# Patient Record
Sex: FEMALE | Race: WHITE | NOT HISPANIC OR LATINO | Employment: OTHER | URBAN - METROPOLITAN AREA
[De-identification: names, ages, dates, MRNs, and addresses within clinical notes are randomized per-mention and may not be internally consistent; named-entity substitution may affect disease eponyms.]

---

## 2018-06-28 ENCOUNTER — PREPPED CHART (OUTPATIENT)
Dept: URBAN - METROPOLITAN AREA CLINIC 43 | Facility: CLINIC | Age: 63
End: 2018-06-28

## 2018-06-28 ENCOUNTER — CONSULT (OUTPATIENT)
Dept: URBAN - METROPOLITAN AREA CLINIC 43 | Facility: CLINIC | Age: 63
End: 2018-06-28

## 2018-06-28 DIAGNOSIS — H02.834: ICD-10-CM

## 2018-06-28 DIAGNOSIS — H02.832: ICD-10-CM

## 2018-06-28 DIAGNOSIS — H02.831: ICD-10-CM

## 2018-06-28 DIAGNOSIS — H02.835: ICD-10-CM

## 2018-06-28 PROCEDURE — 99203 OFFICE O/P NEW LOW 30 MIN: CPT

## 2018-06-28 PROCEDURE — 92285 EXTERNAL OCULAR PHOTOGRAPHY: CPT

## 2018-06-28 ASSESSMENT — VISUAL ACUITY
OS_CC: 20/30
OD_CC: 20/30

## 2018-11-09 NOTE — PATIENT DISCUSSION
Pinguecula Counseling:  I have explained to the patient at length the diagnosis of pinguecula and its pathophysiology. I recommended the patient adequately protect their eyes from excessive UV light and dry, damaso conditions. The use of artificial tears in dry conditions was encouraged. Return for follow-up as scheduled.

## 2018-11-09 NOTE — PATIENT DISCUSSION
DEJAH OU:  PRESCRIBE ARTIFICIAL TEARS BID - QID. DECREASE OUTDOOR EXPOSURE AND USE UV PROTECTION/ SUNGLASSES WITH OUTDOOR ACTIVITIES. RETURN FOR FOLLOW UP AS SCHEDULED.

## 2022-07-09 ENCOUNTER — TELEPHONE ENCOUNTER (OUTPATIENT)
Dept: URBAN - METROPOLITAN AREA CLINIC 121 | Facility: CLINIC | Age: 67
End: 2022-07-09

## 2022-07-09 RX ORDER — PREDNISONE 5 MG/1
30MG DAILY FOR A WEEK, THEN REDUCE BY 5MG EVERY WEEK TABLET ORAL TAKE AS DIRECTED
Refills: 0 | OUTPATIENT
Start: 2013-04-01 | End: 2013-05-07

## 2022-07-09 RX ORDER — CIPROFLOXACIN HCL 500 MG
TABLET ORAL TWICE A DAY
Refills: 0 | OUTPATIENT
Start: 2013-03-20 | End: 2013-04-01

## 2022-07-09 RX ORDER — POLYETHYLENE GLYCOL 3350, SODIUM SULFATE, SODIUM CHLORIDE, POTASSIUM CHLORIDE, ASCORBIC ACID, SODIUM ASCORBATE 7.5-2.691G
KIT ORAL TAKE AS DIRECTED
Refills: 0 | OUTPATIENT
Start: 2013-04-01 | End: 2013-05-07

## 2022-07-09 RX ORDER — PREDNISONE 20 MG/1
40MG ONCE DAILY TABLET ORAL TAKE AS DIRECTED
Refills: 0 | OUTPATIENT
Start: 2013-03-20 | End: 2013-05-07

## 2022-07-10 ENCOUNTER — TELEPHONE ENCOUNTER (OUTPATIENT)
Dept: URBAN - METROPOLITAN AREA CLINIC 121 | Facility: CLINIC | Age: 67
End: 2022-07-10

## 2022-07-10 RX ORDER — PREDNISONE 5 MG/1
TABLET ORAL
Refills: 0 | Status: ACTIVE | COMMUNITY
Start: 2013-05-07

## 2022-07-10 RX ORDER — ZOLMITRIPTAN 5 MG/1
SPRAY, METERED NASAL
Refills: 0 | Status: ACTIVE | COMMUNITY
Start: 2013-03-20

## 2022-11-03 ENCOUNTER — WEB ENCOUNTER (OUTPATIENT)
Dept: URBAN - METROPOLITAN AREA CLINIC 7 | Facility: CLINIC | Age: 67
End: 2022-11-03

## 2022-11-03 ENCOUNTER — LAB OUTSIDE AN ENCOUNTER (OUTPATIENT)
Dept: URBAN - METROPOLITAN AREA CLINIC 7 | Facility: CLINIC | Age: 67
End: 2022-11-03

## 2022-11-03 ENCOUNTER — TELEPHONE ENCOUNTER (OUTPATIENT)
Dept: URBAN - METROPOLITAN AREA CLINIC 7 | Facility: CLINIC | Age: 67
End: 2022-11-03

## 2022-11-03 ENCOUNTER — OFFICE VISIT (OUTPATIENT)
Dept: URBAN - METROPOLITAN AREA CLINIC 7 | Facility: CLINIC | Age: 67
End: 2022-11-03
Payer: MEDICARE

## 2022-11-03 VITALS
BODY MASS INDEX: 25.52 KG/M2 | WEIGHT: 144 LBS | SYSTOLIC BLOOD PRESSURE: 120 MMHG | DIASTOLIC BLOOD PRESSURE: 74 MMHG | TEMPERATURE: 97.7 F | HEIGHT: 63 IN

## 2022-11-03 DIAGNOSIS — K59.09 CHRONIC CONSTIPATION: ICD-10-CM

## 2022-11-03 DIAGNOSIS — K50.819 CROHN'S DISEASE OF SMALL AND LARGE INTESTINES WITH COMPLICATION: ICD-10-CM

## 2022-11-03 DIAGNOSIS — R14.0 BLOATING: ICD-10-CM

## 2022-11-03 PROBLEM — 116289008: Status: ACTIVE | Noted: 2022-11-03

## 2022-11-03 PROBLEM — 236069009: Status: ACTIVE | Noted: 2022-11-03

## 2022-11-03 PROCEDURE — 99205 OFFICE O/P NEW HI 60 MIN: CPT | Performed by: INTERNAL MEDICINE

## 2022-11-03 RX ORDER — ZOLMITRIPTAN 5 MG/1
SPRAY, METERED NASAL
Refills: 0 | Status: DISCONTINUED | COMMUNITY
Start: 2013-03-20

## 2022-11-03 RX ORDER — NAPROXEN SODIUM 220 MG/1
1 TABLET WITH FOOD OR MILK AS NEEDED TABLET ORAL PRN
Status: ACTIVE | COMMUNITY
Start: 2022-11-03

## 2022-11-03 RX ORDER — LORATADINE 10 MG
1 PACKET MIXED WITH 8 OUNCES OF FLUID TABLET,DISINTEGRATING ORAL PRN
Status: ACTIVE | COMMUNITY

## 2022-11-03 RX ORDER — PREDNISONE 5 MG/1
TABLET ORAL
Refills: 0 | Status: DISCONTINUED | COMMUNITY
Start: 2013-05-07

## 2022-11-03 RX ORDER — ZOLMITRIPTAN 5 MG/1
1 TABLET TABLET ORAL PRN
Status: ACTIVE | COMMUNITY
Start: 2022-11-03

## 2022-11-03 NOTE — HPI-TODAY'S VISIT:
68yo F with hx of crohns with TI resection 20+ yrs ago previously on imuran and asacol but has been off everything for over a decade.  Her last colonscopy was 10yrs ago.  she has fmh of colon polyps.  SHe also has chronic arthritis of back and b/l digits.  She has been having progressive constipation over the past few months associated with bloating and abd discomfort and nausea.  she takes miralax once daily but still only has 2 bm per week.  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

## 2022-11-04 ENCOUNTER — LAB OUTSIDE AN ENCOUNTER (OUTPATIENT)
Dept: URBAN - METROPOLITAN AREA CLINIC 7 | Facility: CLINIC | Age: 67
End: 2022-11-04

## 2022-11-04 PROBLEM — 71833008: Status: ACTIVE | Noted: 2022-11-03

## 2022-11-28 ENCOUNTER — CLAIMS CREATED FROM THE CLAIM WINDOW (OUTPATIENT)
Dept: URBAN - METROPOLITAN AREA SURGERY CENTER 5 | Facility: SURGERY CENTER | Age: 67
End: 2022-11-28
Payer: MEDICARE

## 2022-11-28 ENCOUNTER — CLAIMS CREATED FROM THE CLAIM WINDOW (OUTPATIENT)
Dept: URBAN - METROPOLITAN AREA CLINIC 4 | Facility: CLINIC | Age: 67
End: 2022-11-28
Payer: MEDICARE

## 2022-11-28 ENCOUNTER — TELEPHONE ENCOUNTER (OUTPATIENT)
Dept: URBAN - METROPOLITAN AREA CLINIC 7 | Facility: CLINIC | Age: 67
End: 2022-11-28

## 2022-11-28 DIAGNOSIS — K63.89 OTHER SPECIFIED DISEASES OF INTESTINE: ICD-10-CM

## 2022-11-28 DIAGNOSIS — K57.30 ACQUIRED DIVERTICULOSIS OF COLON: ICD-10-CM

## 2022-11-28 DIAGNOSIS — K50.10 CC (CROHN'S COLITIS): ICD-10-CM

## 2022-11-28 DIAGNOSIS — Z12.11 COLON CANCER SCREENING: ICD-10-CM

## 2022-11-28 DIAGNOSIS — K63.89 BACTERIAL OVERGROWTH SYNDROME: ICD-10-CM

## 2022-11-28 PROCEDURE — 45380 COLONOSCOPY AND BIOPSY: CPT | Performed by: CLINIC/CENTER

## 2022-11-28 PROCEDURE — 45380 COLONOSCOPY AND BIOPSY: CPT | Performed by: INTERNAL MEDICINE

## 2022-11-28 PROCEDURE — 88305 TISSUE EXAM BY PATHOLOGIST: CPT | Performed by: PATHOLOGY

## 2022-11-28 RX ORDER — NAPROXEN SODIUM 220 MG/1
1 TABLET WITH FOOD OR MILK AS NEEDED TABLET ORAL PRN
Status: ACTIVE | COMMUNITY
Start: 2022-11-03

## 2022-11-28 RX ORDER — PREDNISONE 10 MG/1
4 TABLETS TABLET ORAL
Qty: 154 | Refills: 0 | Status: ACTIVE | COMMUNITY
Start: 2022-11-28 | End: 2023-01-30

## 2022-11-28 RX ORDER — ZOLMITRIPTAN 5 MG/1
1 TABLET TABLET ORAL PRN
Status: ACTIVE | COMMUNITY
Start: 2022-11-03

## 2022-11-28 RX ORDER — LORATADINE 10 MG
1 PACKET MIXED WITH 8 OUNCES OF FLUID TABLET,DISINTEGRATING ORAL PRN
Status: ACTIVE | COMMUNITY

## 2022-11-28 RX ORDER — PREDNISONE 10 MG/1
4 TABLETS TABLET ORAL
Qty: 154 | Refills: 0 | OUTPATIENT
Start: 2022-11-28 | End: 2023-01-30

## 2022-11-30 ENCOUNTER — TELEPHONE ENCOUNTER (OUTPATIENT)
Dept: URBAN - METROPOLITAN AREA CLINIC 7 | Facility: CLINIC | Age: 67
End: 2022-11-30

## 2022-11-30 RX ORDER — PREDNISONE 10 MG/1
4 TABLET ORAL
Qty: 154 | Refills: 0 | OUTPATIENT
Start: 2022-11-30 | End: 2023-02-01

## 2022-12-05 ENCOUNTER — WEB ENCOUNTER (OUTPATIENT)
Dept: URBAN - METROPOLITAN AREA CLINIC 7 | Facility: CLINIC | Age: 67
End: 2022-12-05

## 2023-09-20 ENCOUNTER — DASHBOARD ENCOUNTERS (OUTPATIENT)
Age: 68
End: 2023-09-20

## 2023-09-20 ENCOUNTER — LAB OUTSIDE AN ENCOUNTER (OUTPATIENT)
Dept: URBAN - METROPOLITAN AREA CLINIC 7 | Facility: CLINIC | Age: 68
End: 2023-09-20

## 2023-09-20 ENCOUNTER — OFFICE VISIT (OUTPATIENT)
Dept: URBAN - METROPOLITAN AREA CLINIC 7 | Facility: CLINIC | Age: 68
End: 2023-09-20
Payer: MEDICARE

## 2023-09-20 VITALS
HEIGHT: 63 IN | TEMPERATURE: 97.8 F | BODY MASS INDEX: 25.69 KG/M2 | WEIGHT: 145 LBS | DIASTOLIC BLOOD PRESSURE: 70 MMHG | HEART RATE: 72 BPM | SYSTOLIC BLOOD PRESSURE: 122 MMHG

## 2023-09-20 DIAGNOSIS — K50.819 CROHN'S DISEASE OF SMALL AND LARGE INTESTINES WITH COMPLICATION: ICD-10-CM

## 2023-09-20 DIAGNOSIS — K59.09 CHRONIC CONSTIPATION: ICD-10-CM

## 2023-09-20 DIAGNOSIS — R14.0 BLOATING: ICD-10-CM

## 2023-09-20 DIAGNOSIS — R10.13 EPIGASTRIC PAIN: ICD-10-CM

## 2023-09-20 PROBLEM — 79922009: Status: ACTIVE | Noted: 2023-09-20

## 2023-09-20 PROCEDURE — 99215 OFFICE O/P EST HI 40 MIN: CPT | Performed by: INTERNAL MEDICINE

## 2023-09-20 RX ORDER — ZOLMITRIPTAN 5 MG/1
1 TABLET TABLET, FILM COATED ORAL
Status: ACTIVE | COMMUNITY

## 2023-09-20 RX ORDER — LORATADINE 10 MG
1 PACKET MIXED WITH 8 OUNCES OF FLUID TABLET,DISINTEGRATING ORAL PRN
Status: ACTIVE | COMMUNITY

## 2023-09-20 RX ORDER — ZOLMITRIPTAN 5 MG/1
1 TABLET TABLET ORAL PRN
Status: DISCONTINUED | COMMUNITY
Start: 2022-11-03

## 2023-09-20 RX ORDER — GABAPENTIN 300 MG/1
2 CAPSULE CAPSULE ORAL AT BEDTIME
Status: ACTIVE | COMMUNITY

## 2023-09-20 RX ORDER — PANTOPRAZOLE SODIUM 40 MG/1
TABLET, DELAYED RELEASE ORAL
Qty: 180 TABLET | Status: ACTIVE | COMMUNITY

## 2023-09-20 RX ORDER — NAPROXEN SODIUM 220 MG/1
1 TABLET WITH FOOD OR MILK AS NEEDED TABLET ORAL PRN
Status: ACTIVE | COMMUNITY
Start: 2022-11-03

## 2023-09-20 RX ORDER — FAMOTIDINE 20 MG/1
1 TABLET AT BEDTIME AS NEEDED TABLET ORAL ONCE A DAY
Qty: 90 TABLET | Status: ACTIVE | COMMUNITY

## 2023-09-20 RX ORDER — FAMOTIDINE 20 MG/1
1 TABLET AT BEDTIME AS NEEDED TABLET ORAL ONCE A DAY
OUTPATIENT

## 2023-09-20 NOTE — HPI-TODAY'S VISIT:
69yo F with hx of crohns, dx in her 30s, with small bowel resection,  hx of stomach ulcers.  She was taking asacol for 1 year, went to [facility] in her 30s or 40s took steroids and things settled down.  Has been off of medication for years.   had covid in july which exarbated her crohns  Has had extreme sense of fullness and slow emptying and transit.  gets constipated and feesl like her small intestine is just not moving.  she gets nausea and bloating.  s/p colonoscopy by myself in 2022 biopsies done every 10cm (8 jars) did not show active crohns.  CTE in 2022 did not show small bowel crohns.   There has been no associated  dysphagia,  odynophagia, anorexia, weight loss,  anemia, occult blood in stool, rectal bleeding, melena,  nausea, vomiting, abdominal pain, GERD, postprandial fullness, early satiety, abdominal distention, bloating, change in bowel habits, change in stool caliber, diarrhea, constipation,   she has been using ibuprofen for back pain daily. her pcp started her on famotidine which is helping   a/p: abd pain, fullness, bloating.  suspect either small bowel crohns vs adhesions vs NsAID induced ulcer - strict nsaid avoidance - egd -c/w famotidine -CTE

## 2023-09-26 ENCOUNTER — OFFICE VISIT (OUTPATIENT)
Dept: URBAN - METROPOLITAN AREA SURGERY CENTER 5 | Facility: SURGERY CENTER | Age: 68
End: 2023-09-26
Payer: MEDICARE

## 2023-09-26 ENCOUNTER — CLAIMS CREATED FROM THE CLAIM WINDOW (OUTPATIENT)
Dept: URBAN - METROPOLITAN AREA CLINIC 4 | Facility: CLINIC | Age: 68
End: 2023-09-26
Payer: MEDICARE

## 2023-09-26 DIAGNOSIS — K29.70 GASTRITIS WITHOUT BLEEDING, UNSPECIFIED CHRONICITY, UNSPECIFIED GASTRITIS TYPE: ICD-10-CM

## 2023-09-26 DIAGNOSIS — K44.9 DIAPHRAGMATIC HERNIA WITHOUT OBSTRUCTION OR GANGRENE: ICD-10-CM

## 2023-09-26 DIAGNOSIS — K29.70 GASTRITIS, UNSPECIFIED, WITHOUT BLEEDING: ICD-10-CM

## 2023-09-26 DIAGNOSIS — K22.89 OTHER SPECIFIED DISEASE OF ESOPHAGUS: ICD-10-CM

## 2023-09-26 DIAGNOSIS — K63.89 OTHER SPECIFIED DISEASES OF INTESTINE: ICD-10-CM

## 2023-09-26 PROCEDURE — 88305 TISSUE EXAM BY PATHOLOGIST: CPT | Performed by: PATHOLOGY

## 2023-09-26 PROCEDURE — 88312 SPECIAL STAINS GROUP 1: CPT | Performed by: PATHOLOGY

## 2023-09-26 PROCEDURE — 43239 EGD BIOPSY SINGLE/MULTIPLE: CPT | Performed by: INTERNAL MEDICINE

## 2023-09-26 PROCEDURE — 43239 EGD BIOPSY SINGLE/MULTIPLE: CPT | Performed by: CLINIC/CENTER

## 2023-09-26 RX ORDER — GABAPENTIN 300 MG/1
2 CAPSULE CAPSULE ORAL AT BEDTIME
Status: ACTIVE | COMMUNITY

## 2023-09-26 RX ORDER — FAMOTIDINE 20 MG/1
1 TABLET AT BEDTIME AS NEEDED TABLET ORAL ONCE A DAY
Status: ACTIVE | COMMUNITY

## 2023-09-26 RX ORDER — LORATADINE 10 MG
1 PACKET MIXED WITH 8 OUNCES OF FLUID TABLET,DISINTEGRATING ORAL PRN
Status: ACTIVE | COMMUNITY

## 2023-09-26 RX ORDER — NAPROXEN SODIUM 220 MG/1
1 TABLET WITH FOOD OR MILK AS NEEDED TABLET ORAL PRN
Status: ACTIVE | COMMUNITY
Start: 2022-11-03

## 2023-09-26 RX ORDER — PANTOPRAZOLE SODIUM 40 MG/1
TABLET, DELAYED RELEASE ORAL
Qty: 180 TABLET | Status: ACTIVE | COMMUNITY

## 2023-09-26 RX ORDER — ZOLMITRIPTAN 5 MG/1
1 TABLET TABLET, FILM COATED ORAL
Status: ACTIVE | COMMUNITY

## 2023-09-27 PROBLEM — 4556007: Status: ACTIVE | Noted: 2023-09-27

## 2023-10-03 ENCOUNTER — TELEPHONE ENCOUNTER (OUTPATIENT)
Dept: URBAN - METROPOLITAN AREA CLINIC 7 | Facility: CLINIC | Age: 68
End: 2023-10-03

## 2023-10-03 ENCOUNTER — LAB OUTSIDE AN ENCOUNTER (OUTPATIENT)
Dept: URBAN - METROPOLITAN AREA CLINIC 7 | Facility: CLINIC | Age: 68
End: 2023-10-03

## 2023-10-09 ENCOUNTER — TELEPHONE ENCOUNTER (OUTPATIENT)
Dept: URBAN - METROPOLITAN AREA CLINIC 7 | Facility: CLINIC | Age: 68
End: 2023-10-09

## 2023-10-09 ENCOUNTER — OFFICE VISIT (OUTPATIENT)
Dept: URBAN - METROPOLITAN AREA SURGERY CENTER 5 | Facility: SURGERY CENTER | Age: 68
End: 2023-10-09
Payer: MEDICARE

## 2023-10-09 ENCOUNTER — CLAIMS CREATED FROM THE CLAIM WINDOW (OUTPATIENT)
Dept: URBAN - METROPOLITAN AREA CLINIC 4 | Facility: CLINIC | Age: 68
End: 2023-10-09
Payer: MEDICARE

## 2023-10-09 DIAGNOSIS — Z87.19 HISTORY OF CROHN'S DISEASE: ICD-10-CM

## 2023-10-09 DIAGNOSIS — R93.3 ABNORMAL FINDING ON DIAGNOSTIC IMAGING OF GI TRACT: ICD-10-CM

## 2023-10-09 DIAGNOSIS — Z98.0 INTESTINAL BYPASS AND ANASTOMOSIS STATUS: ICD-10-CM

## 2023-10-09 DIAGNOSIS — Z87.19 PERSONAL HISTORY OF OTHER DISEASES OF THE DIGESTIVE SYSTEM: ICD-10-CM

## 2023-10-09 DIAGNOSIS — K63.89 OTHER SPECIFIED DISEASES OF INTESTINE: ICD-10-CM

## 2023-10-09 DIAGNOSIS — R93.3 ABNORMAL FINDINGS ON DIAGNOSTIC IMAGING OF OTHER PARTS OF DIGESTIVE TRACT: ICD-10-CM

## 2023-10-09 DIAGNOSIS — Z98.0 INTESTINAL ANASTOMOSIS PRESENT: ICD-10-CM

## 2023-10-09 PROCEDURE — 88305 TISSUE EXAM BY PATHOLOGIST: CPT | Performed by: PATHOLOGY

## 2023-10-09 PROCEDURE — 45380 COLONOSCOPY AND BIOPSY: CPT | Performed by: INTERNAL MEDICINE

## 2023-10-09 PROCEDURE — 00811 ANES LWR INTST NDSC NOS: CPT | Performed by: NURSE ANESTHETIST, CERTIFIED REGISTERED

## 2023-10-09 PROCEDURE — 45380 COLONOSCOPY AND BIOPSY: CPT | Performed by: CLINIC/CENTER

## 2023-10-09 RX ORDER — NAPROXEN SODIUM 220 MG/1
1 TABLET WITH FOOD OR MILK AS NEEDED TABLET ORAL PRN
Status: ACTIVE | COMMUNITY
Start: 2022-11-03

## 2023-10-09 RX ORDER — PANTOPRAZOLE SODIUM 40 MG/1
TABLET, DELAYED RELEASE ORAL
Qty: 180 TABLET | Status: ACTIVE | COMMUNITY

## 2023-10-09 RX ORDER — FAMOTIDINE 20 MG/1
1 TABLET AT BEDTIME AS NEEDED TABLET ORAL ONCE A DAY
Status: ACTIVE | COMMUNITY

## 2023-10-09 RX ORDER — LORATADINE 10 MG
1 PACKET MIXED WITH 8 OUNCES OF FLUID TABLET,DISINTEGRATING ORAL PRN
Status: ACTIVE | COMMUNITY

## 2023-10-09 RX ORDER — GABAPENTIN 300 MG/1
2 CAPSULE CAPSULE ORAL AT BEDTIME
Status: ACTIVE | COMMUNITY

## 2023-10-09 RX ORDER — BUDESONIDE 3 MG/1
3 CAPSULE ORAL
Qty: 160 CAPSULE | Refills: 1 | OUTPATIENT
Start: 2023-10-09

## 2023-10-09 RX ORDER — ZOLMITRIPTAN 5 MG/1
1 TABLET TABLET, FILM COATED ORAL
Status: ACTIVE | COMMUNITY

## 2023-11-03 ENCOUNTER — TELEPHONE ENCOUNTER (OUTPATIENT)
Dept: URBAN - METROPOLITAN AREA CLINIC 7 | Facility: CLINIC | Age: 68
End: 2023-11-03

## 2025-08-13 ENCOUNTER — LAB OUTSIDE AN ENCOUNTER (OUTPATIENT)
Dept: URBAN - METROPOLITAN AREA CLINIC 9 | Facility: CLINIC | Age: 70
End: 2025-08-13

## 2025-08-13 ENCOUNTER — OFFICE VISIT (OUTPATIENT)
Dept: URBAN - METROPOLITAN AREA CLINIC 9 | Facility: CLINIC | Age: 70
End: 2025-08-13
Payer: MEDICARE

## 2025-08-13 DIAGNOSIS — K44.9 DIAPHRAGMATIC HERNIA WITHOUT OBSTRUCTION OR GANGRENE: ICD-10-CM

## 2025-08-13 DIAGNOSIS — K29.70 GASTRITIS WITHOUT BLEEDING, UNSPECIFIED CHRONICITY, UNSPECIFIED GASTRITIS TYPE: ICD-10-CM

## 2025-08-13 DIAGNOSIS — K50.819 CROHN'S DISEASE OF SMALL AND LARGE INTESTINES WITH COMPLICATION: ICD-10-CM

## 2025-08-13 DIAGNOSIS — K22.89 OTHER SPECIFIED DISEASE OF ESOPHAGUS: ICD-10-CM

## 2025-08-13 PROCEDURE — 99214 OFFICE O/P EST MOD 30 MIN: CPT | Performed by: INTERNAL MEDICINE

## 2025-08-13 RX ORDER — PANTOPRAZOLE SODIUM 40 MG/1
TABLET, DELAYED RELEASE ORAL
Qty: 180 TABLET | Status: DISCONTINUED | COMMUNITY

## 2025-08-13 RX ORDER — ZOLMITRIPTAN 5 MG/1
1 TABLET TABLET, FILM COATED ORAL
Status: ACTIVE | COMMUNITY

## 2025-08-13 RX ORDER — BUDESONIDE 3 MG/1
3 CAPSULE ORAL
Qty: 160 CAPSULE | Refills: 1 | Status: DISCONTINUED | COMMUNITY
Start: 2023-10-09

## 2025-08-13 RX ORDER — GABAPENTIN 300 MG/1
2 CAPSULE CAPSULE ORAL AT BEDTIME
Status: DISCONTINUED | COMMUNITY

## 2025-08-13 RX ORDER — NAPROXEN SODIUM 220 MG
1 TABLET WITH FOOD OR MILK AS NEEDED CAPSULE ORAL PRN
Status: ACTIVE | COMMUNITY
Start: 2022-11-03

## 2025-08-13 RX ORDER — FAMOTIDINE 20 MG/1
1 TABLET AT BEDTIME AS NEEDED TABLET ORAL ONCE A DAY
Status: ACTIVE | COMMUNITY

## 2025-08-13 RX ORDER — LORATADINE 10 MG
1 PACKET MIXED WITH 8 OUNCES OF FLUID TABLET,DISINTEGRATING ORAL PRN
Status: ACTIVE | COMMUNITY